# Patient Record
Sex: MALE | Race: WHITE | NOT HISPANIC OR LATINO | Employment: FULL TIME | URBAN - METROPOLITAN AREA
[De-identification: names, ages, dates, MRNs, and addresses within clinical notes are randomized per-mention and may not be internally consistent; named-entity substitution may affect disease eponyms.]

---

## 2020-02-25 ENCOUNTER — HOSPITAL ENCOUNTER (EMERGENCY)
Facility: OTHER | Age: 35
Discharge: HOME | End: 2020-02-25
Attending: EMERGENCY MEDICINE
Payer: COMMERCIAL

## 2020-02-25 VITALS
RESPIRATION RATE: 16 BRPM | BODY MASS INDEX: 24.38 KG/M2 | OXYGEN SATURATION: 97 % | DIASTOLIC BLOOD PRESSURE: 76 MMHG | WEIGHT: 190 LBS | HEIGHT: 74 IN | TEMPERATURE: 98 F | SYSTOLIC BLOOD PRESSURE: 129 MMHG | HEART RATE: 84 BPM

## 2020-02-25 DIAGNOSIS — T14.8XXA ABRASION: ICD-10-CM

## 2020-02-25 DIAGNOSIS — S09.90XA CLOSED HEAD INJURY, INITIAL ENCOUNTER: ICD-10-CM

## 2020-02-25 DIAGNOSIS — S01.81XA FACIAL LACERATION, INITIAL ENCOUNTER: Primary | ICD-10-CM

## 2020-02-25 PROCEDURE — 99284 EMERGENCY DEPT VISIT MOD MDM: CPT | Mod: 25

## 2020-02-25 PROCEDURE — 25000003 PHARM REV CODE 250: Performed by: EMERGENCY MEDICINE

## 2020-02-25 PROCEDURE — 25000003 PHARM REV CODE 250: Performed by: PHYSICIAN ASSISTANT

## 2020-02-25 PROCEDURE — 90715 TDAP VACCINE 7 YRS/> IM: CPT | Performed by: PHYSICIAN ASSISTANT

## 2020-02-25 PROCEDURE — 63600175 PHARM REV CODE 636 W HCPCS: Performed by: PHYSICIAN ASSISTANT

## 2020-02-25 PROCEDURE — 12011 RPR F/E/E/N/L/M 2.5 CM/<: CPT

## 2020-02-25 PROCEDURE — 90471 IMMUNIZATION ADMIN: CPT | Performed by: PHYSICIAN ASSISTANT

## 2020-02-25 RX ORDER — BACITRACIN ZINC 500 UNIT/G
1 OINTMENT (GRAM) TOPICAL 2 TIMES DAILY
Status: DISCONTINUED | OUTPATIENT
Start: 2020-02-25 | End: 2020-02-25

## 2020-02-25 RX ORDER — SERTRALINE HYDROCHLORIDE 100 MG/1
100 TABLET, FILM COATED ORAL DAILY
COMMUNITY

## 2020-02-25 RX ORDER — PROPRANOLOL HYDROCHLORIDE 40 MG/1
20 TABLET ORAL 3 TIMES DAILY
COMMUNITY

## 2020-02-25 RX ORDER — BACITRACIN ZINC 500 UNIT/G
1 OINTMENT (GRAM) TOPICAL
Status: COMPLETED | OUTPATIENT
Start: 2020-02-25 | End: 2020-02-25

## 2020-02-25 RX ORDER — LIDOCAINE HYDROCHLORIDE 10 MG/ML
10 INJECTION INFILTRATION; PERINEURAL
Status: COMPLETED | OUTPATIENT
Start: 2020-02-25 | End: 2020-02-25

## 2020-02-25 RX ADMIN — LIDOCAINE HYDROCHLORIDE 10 ML: 10 INJECTION, SOLUTION INFILTRATION; PERINEURAL at 06:02

## 2020-02-25 RX ADMIN — Medication 1 TUBE: at 07:02

## 2020-02-25 RX ADMIN — CLOSTRIDIUM TETANI TOXOID ANTIGEN (FORMALDEHYDE INACTIVATED), CORYNEBACTERIUM DIPHTHERIAE TOXOID ANTIGEN (FORMALDEHYDE INACTIVATED), BORDETELLA PERTUSSIS TOXOID ANTIGEN (GLUTARALDEHYDE INACTIVATED), BORDETELLA PERTUSSIS FILAMENTOUS HEMAGGLUTININ ANTIGEN (FORMALDEHYDE INACTIVATED), BORDETELLA PERTUSSIS PERTACTIN ANTIGEN, AND BORDETELLA PERTUSSIS FIMBRIAE 2/3 ANTIGEN 0.5 ML: 5; 2; 2.5; 5; 3; 5 INJECTION, SUSPENSION INTRAMUSCULAR at 06:02

## 2020-02-26 NOTE — DISCHARGE INSTRUCTIONS
Please have the sutures removed on day 5    Follow up with family doctor this week. Keep the wound dry and clean. Watch for signs of infection including redness, swelling, drainage, fever or chills. Return to the ED with worsening or concerning symptoms.

## 2020-02-26 NOTE — ED NOTES
Pt's friend is on the way to give him a ride home, lacerations are dressed and pt is prepared for discharge.

## 2020-02-26 NOTE — ED PROVIDER NOTES
Encounter Date: 2/25/2020       History     Chief Complaint   Patient presents with    Fall     trip and fall. approx 1 cm laceratin to nose. pt denies loc.      Patient is a 34-year-old male presenting to the ER for evaluation after fall that occurred.  Patient reports he tripped and fell and hit his face on to an object.  He is unsure what he hit.  Patient reports that he did not lose consciousness.  He noted blood to his face.  He denies any blurred vision or visual disturbances.  Denies numbness or tingling.  Patient did not take any medication prior to arrival to the ED.  He is unsure of tetanus status.  Does not use any blood thinners.    The history is provided by the patient.     Review of patient's allergies indicates:   Allergen Reactions    Penicillins Anaphylaxis     Past Medical History:   Diagnosis Date    Anxiety      Past Surgical History:   Procedure Laterality Date    HERNIA REPAIR       No family history on file.  Social History     Tobacco Use    Smoking status: Never Smoker   Substance Use Topics    Alcohol use: Yes     Alcohol/week: 2.0 standard drinks     Types: 2 Cans of beer per week    Drug use: Not on file     Review of Systems   Constitutional: Negative for chills and fever.   HENT: Negative for congestion.    Eyes: Negative for photophobia and visual disturbance.   Respiratory: Negative for cough and shortness of breath.    Cardiovascular: Negative for chest pain.   Gastrointestinal: Negative for nausea and vomiting.   Genitourinary: Negative for flank pain.   Musculoskeletal: Positive for arthralgias. Negative for back pain, myalgias, neck pain and neck stiffness.   Skin: Positive for wound.   Allergic/Immunologic: Negative for immunocompromised state.   Neurological: Negative for dizziness.   Hematological: Does not bruise/bleed easily.   Psychiatric/Behavioral: Negative for confusion.       Physical Exam     Initial Vitals [02/25/20 1759]   BP Pulse Resp Temp SpO2   125/80 91  18 98.2 °F (36.8 °C) 96 %      MAP       --         Physical Exam    Vitals reviewed.  Constitutional: He appears well-developed and well-nourished. He is not diaphoretic. No distress.   HENT:   Head: Normocephalic. Head is with abrasion and with laceration. Head is without raccoon's eyes and without White's sign.   Right Ear: External ear normal.   Left Ear: External ear normal.   Nose: Nose lacerations present. No nasal deformity, septal deviation or nasal septal hematoma. No epistaxis.   Mouth/Throat: Oropharynx is clear and moist.   Tenderness to the left orbit   Eyes: Conjunctivae are normal. Pupils are equal, round, and reactive to light. Right eye exhibits nystagmus. Left eye exhibits nystagmus.   Neck: Normal range of motion and full passive range of motion without pain. Neck supple. No spinous process tenderness and no muscular tenderness present.   Cardiovascular: Normal rate, regular rhythm, normal heart sounds and intact distal pulses.   Pulmonary/Chest: Breath sounds normal. No respiratory distress. He has no wheezes.   Neurological: He is alert and oriented to person, place, and time.   Skin: Skin is warm. Laceration (0.5cm laceration to bridge of nose) noted.         ED Course   Lac Repair  Date/Time: 2/26/2020 12:53 AM  Performed by: Maye Loja PA-C  Authorized by: Alexis Turner MD   Body area: head/neck  Location details: nose  Laceration length: 0.5 cm  Foreign bodies: no foreign bodies  Tendon involvement: none  Nerve involvement: none  Anesthesia: local infiltration    Anesthesia:  Local anesthesia used: yes  Local Anesthetic: lidocaine 1% without epinephrine  Irrigation solution: saline  Irrigation method: jet lavage  Amount of cleaning: standard  Debridement: none  Degree of undermining: none  Skin closure: 6-0 nylon  Number of sutures: 3  Technique: simple  Approximation: close  Approximation difficulty: simple  Dressing: 4x4 sterile gauze and antibiotic ointment  Patient tolerance:  Patient tolerated the procedure well with no immediate complications        Labs Reviewed - No data to display       Imaging Results          CT Maxillofacial Without Contrast (Final result)  Result time 02/25/20 19:10:01    Final result by Adria Escobedo MD (02/25/20 19:10:01)                 Impression:      No acute facial fractures identified.      Electronically signed by: Adria Escobedo MD  Date:    02/25/2020  Time:    19:10             Narrative:    EXAMINATION:  CT MAXILLOFACIAL WITHOUT CONTRAST    CLINICAL HISTORY:  Facial fracture(s);Nasal fracture, suspected;facial injury;    TECHNIQUE:  Low dose axial images, sagittal and coronal reformations were obtained through the face.  Contrast was not administered.    COMPARISON:  None    FINDINGS:  No acute displaced facial fractures are identified.  Visualized paranasal sinuses and mastoid air cells are essentially clear.  Visualized portion of the brain shows no significant abnormalities.  Orbits are normal in appearance.  Visualized upper cervical spine shows no acute abnormalities.  Prominent degenerative change and intervertebral disc space narrowing is seen at the C5-6 level.                                       APC / Resident Notes:   Patient is seen in the ER promptly upon arrival.  He is afebrile, no acute distress. Patient appears to be slightly intoxicated.   Patient reports he was drinking alcohol prior to arrival to the ED.  He is however alert and oriented x4.  There is a 0.5 cm laceration to the nasal bridge without deformity.  No septal hematoma, epistaxis noted. Patient does have some tenderness on palpation to the left orbit as well. There is a 4cm abrasion to the right forehead.  Heart lung sounds normal. No spinous process tenderness on palpation.    CT max face was unremarkable. No evidence of acute facial fracture.  0.5 cm laceration to the nasal bridge was repaired using 1% lidocaine.  Three sutures were placed using 6-0 nylon sutures.   Good approximation.  Bleeding controlled.  Bacitracin was applied to the abrasion and the laceration.  Patient advised to have the sutures removed in 5 days.  He was given strict return precautions the ED.  Discharged home on close head injury precautions.  Patient stable for discharge at this time.                            Clinical Impression:       ICD-10-CM ICD-9-CM   1. Facial laceration, initial encounter S01.81XA 873.40   2. Abrasion T14.8XXA 919.0   3. Closed head injury, initial encounter S09.90XA 959.01         Disposition:   Disposition: Discharged  Condition: Stable     ED Disposition Condition    Discharge Stable        ED Prescriptions     None        Follow-up Information    None                                    Maye Loja PA-C  02/25/20 1937       Maye Loja PA-C  02/26/20 0054

## 2020-02-26 NOTE — ED NOTES
34 y.o. Male to ED BIB ambulance with c.o. Facial lacerations s.p. Trip and fall. Patient unsure what he hit his head on and unsure if he lost consciousness. 6cm abrasion noted to right forehead above eyebrow, 2cm laceration noted to bridge of nose. Patient endorses 5/10 pain in face, denies headache, blurred vision, nausea/ vomiting. +PERRL, equal strength and sensation bilaterally. VS stable at triage.